# Patient Record
Sex: FEMALE | Race: WHITE | NOT HISPANIC OR LATINO | Employment: OTHER | URBAN - METROPOLITAN AREA
[De-identification: names, ages, dates, MRNs, and addresses within clinical notes are randomized per-mention and may not be internally consistent; named-entity substitution may affect disease eponyms.]

---

## 2024-01-27 ENCOUNTER — APPOINTMENT (OUTPATIENT)
Dept: RADIOLOGY | Facility: MEDICAL CENTER | Age: 89
End: 2024-01-27
Attending: EMERGENCY MEDICINE
Payer: MEDICARE

## 2024-01-27 ENCOUNTER — HOSPITAL ENCOUNTER (EMERGENCY)
Facility: MEDICAL CENTER | Age: 89
End: 2024-01-27
Attending: EMERGENCY MEDICINE
Payer: MEDICARE

## 2024-01-27 VITALS
OXYGEN SATURATION: 91 % | HEIGHT: 64 IN | RESPIRATION RATE: 18 BRPM | HEART RATE: 98 BPM | WEIGHT: 135 LBS | BODY MASS INDEX: 23.05 KG/M2 | DIASTOLIC BLOOD PRESSURE: 75 MMHG | SYSTOLIC BLOOD PRESSURE: 130 MMHG | TEMPERATURE: 97.8 F

## 2024-01-27 DIAGNOSIS — S00.83XA FACIAL CONTUSION, INITIAL ENCOUNTER: ICD-10-CM

## 2024-01-27 DIAGNOSIS — R09.02 HYPOXIA: ICD-10-CM

## 2024-01-27 DIAGNOSIS — S01.81XA FACIAL LACERATION, INITIAL ENCOUNTER: ICD-10-CM
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
## 2024-01-27 DIAGNOSIS — S09.90XA CLOSED HEAD INJURY, INITIAL ENCOUNTER: ICD-10-CM

## 2024-01-27 PROCEDURE — 99284 EMERGENCY DEPT VISIT MOD MDM: CPT

## 2024-01-27 PROCEDURE — 700101 HCHG RX REV CODE 250: Performed by: EMERGENCY MEDICINE

## 2024-01-27 PROCEDURE — 303747 HCHG EXTRA SUTURE

## 2024-01-27 PROCEDURE — 71045 X-RAY EXAM CHEST 1 VIEW: CPT

## 2024-01-27 PROCEDURE — 70450 CT HEAD/BRAIN W/O DYE: CPT

## 2024-01-27 PROCEDURE — 73562 X-RAY EXAM OF KNEE 3: CPT | Mod: RT

## 2024-01-27 PROCEDURE — 304999 HCHG REPAIR-SIMPLE/INTERMED LEVEL 1

## 2024-01-27 PROCEDURE — 70486 CT MAXILLOFACIAL W/O DYE: CPT

## 2024-01-27 RX ORDER — LIDOCAINE HYDROCHLORIDE 20 MG/ML
20 INJECTION, SOLUTION INFILTRATION; PERINEURAL ONCE
Status: COMPLETED | OUTPATIENT
Start: 2024-01-27 | End: 2024-01-27

## 2024-01-27 RX ADMIN — LIDOCAINE HYDROCHLORIDE 20 ML: 20 INJECTION, SOLUTION INFILTRATION; PERINEURAL at 13:00

## 2024-01-27 ASSESSMENT — PAIN DESCRIPTION - PAIN TYPE: TYPE: ACUTE PAIN

## 2024-01-27 NOTE — ED NOTES
"Pt discharged to home w/ steady gait. Pt A&Ox4 on RA. Pt in possession of belongings. Pt provided discharge education and information pertaining to medications and follow up appointments. Pt received copy of discharge instructions and verbalized understanding. /75   Pulse 98   Temp 36.6 °C (97.8 °F) (Temporal)   Resp 18   Ht 1.626 m (5' 4\")   Wt 61.2 kg (135 lb)   SpO2 91%   BMI 23.17 kg/m²   "

## 2024-01-27 NOTE — ED TRIAGE NOTES
Chief Complaint   Patient presents with    T-5000 GLF     Pt suffered a GLF from bed    T-5000 Head Injury     Secondary to fall.  Face vs nightstand       Pt BIB family for above complaints. Per pt, pt was attempting to get into bed when she slipped and struck her face on her night stand. - LOC. + daily ASA  Pt is alert and oriented, speaking in full sentences, follows commands and responds appropriately to questions. NAD. Resp are even and unlabored.

## 2024-01-27 NOTE — ED PROVIDER NOTES
ED Provider Note    CHIEF COMPLAINT  Chief Complaint   Patient presents with    T-5000 GLF     Pt suffered a GLF from bed    T-5000 Head Injury     Secondary to fall.  Face vs nightstand       EXTERNAL RECORDS REVIEWED  I reviewed the patient's chart for previous records and none were available    HPI/ROS  LIMITATION TO HISTORY   Select: : None  OUTSIDE HISTORIAN(S):  Family the patient's daughter states that they heard gurgling in her lungs at the urgent care where she was first evaluated    Joaquina Rodriguez is a 89 y.o. female who presents in the care of her daughter after having a fall last night while walking back from the bathroom.  The patient is supposed to use a cane when she walks but she was not using it at the time of her fall.  The patient states she was walking back to the bathroom and could not get to the bed and instead tripped and fell hitting her face on the nightstand and then falling to the floor.  She is complaining of pain in her face.  She denies any headache or vision changes extremity weakness or numbness.  She does have some right-sided knee pain.  The patient states she has a meniscal tear but they will not fix it surgically because of her age but her pain is increased since the fall.  Patient denies any pain in her chest or abdomen no shortness of breath no unilateral weakness or numbness no nausea or vomiting.  She takes an aspirin daily but no other blood thinners.  Her tetanus is up-to-date.    PAST MEDICAL HISTORY       SURGICAL HISTORY  patient denies any surgical history    FAMILY HISTORY  No family history on file.    SOCIAL HISTORY  Social History     Tobacco Use    Smoking status: Not on file    Smokeless tobacco: Not on file   Substance and Sexual Activity    Alcohol use: Not on file    Drug use: Not on file    Sexual activity: Not on file       CURRENT MEDICATIONS  Home Medications       Reviewed by Christy Schofield R.N. (Registered Nurse) on 01/27/24 at 1134  Med List Status: Not  "Addressed     Medication Last Dose Status        Patient Riki Taking any Medications                           ALLERGIES  No Known Allergies    PHYSICAL EXAM  VITAL SIGNS: /78   Pulse (!) 102   Temp 36.4 °C (97.6 °F) (Temporal)   Resp 14   Ht 1.626 m (5' 4\")   Wt 61.2 kg (135 lb)   SpO2 92%   BMI 23.17 kg/m²  87% on room air positive hypoxia    General: Alert awake GCS 15 no acute distress  HEENT: Normocephalic positive for contusion to her left lower cheek without bony step-offs or tenderness no loose teeth or malocclusion no reddy signs or raccoon eyes no septal hematoma on her left lower lip she has a stellate shaped laceration with some mild bleeding  Neck:  No C-spine tenderness step-offs or crepitance trachea midline  Pulmonary: Lungs clear to auscultation bilaterally no wheezes rales or rhonchi no bony crepitance or subcutaneous emphysema no paradoxical chest wall movements no contusions  Cardiac: Regular rate and rhythm no murmurs rubs or gallops equal strong pulses in all extremities   Abdomen: Soft scaphoid nontender nondistended no rebound masses or peritoneal signs no seatbelt sign  Skin: Left facial contusion no lacerations contusions or abrasions no cyanosis  Extremities/Musculoskeletal: Hips and pelvis stable and nontender to palpation no obvious extremity deformity contusions or abrasions equal strength and sensation upper and lower extremities bilaterally  Back:  No T-spine or LS-spine tenderness step-offs or crepitance  Neuro: Alert and oriented x 3 no focal deficits         DIAGNOSTIC STUDIES / PROCEDURES  EKG  I have independently interpreted this EKG  none    LABS  none    RADIOLOGY  I have independently interpreted the diagnostic imaging associated with this visit and am waiting the final reading from the radiologist.   My preliminary interpretation is as follows: ct head no intracranial hemorrhage or mass effect  Radiologist interpretation:   DX-CHEST-PORTABLE (1 VIEW)   Final " Result         1. No acute cardiopulmonary abnormalities are identified.      DX-KNEE 3 VIEWS RIGHT   Final Result         1. No acute osseous abnormality.      CT-MAXILLOFACIAL W/O PLUS RECONS   Final Result         1. No acute maxillofacial fracture.   2. Soft tissue swelling and hematoma about the left infraorbital region.      CT-HEAD W/O   Final Result         1. No acute intracranial abnormality. No evidence of acute intracranial hemorrhage or mass lesion.      2. Soft tissue swelling and hematoma along the left face.                    COURSE & MEDICAL DECISION MAKING    ED Observation Status? No; Patient does not meet criteria for ED Observation.     INITIAL ASSESSMENT, COURSE AND PLAN  Care Narrative: Joaquina Rodriguez is a 89 y.o. female who presents in the care of her daughter after having a fall last night while walking back from the bathroom.  The patient is supposed to use a cane when she walks but she was not using it at the time of her fall.  The patient states she was walking back to the bathroom and could not get to the bed and instead tripped and fell hitting her face on the nightstand and then falling to the floor.  She is complaining of pain in her face.  She denies any headache or vision changes extremity weakness or numbness.  She does have some right-sided knee pain.  The patient states she has a meniscal tear but they will not fix it surgically because of her age but her pain is increased since the fall.  Patient denies any pain in her chest or abdomen no shortness of breath no unilateral weakness or numbness no nausea or vomiting.  She denies any headache dizziness chest pain or weakness prior to her fall.  She takes an aspirin daily but no other blood thinners.  Her tetanus is up-to-date.  On Physical exam she has a laceration to her left lower lip on the mucosal surface and bruising on her below her left eye without obvious bony step-offs or crepitance loose teeth or malocclusion.  She has  no other head injuries or lacerations.  She has no C-spine T-spine or LS-spine tenderness lungs are clear to auscultation abdomen is soft and nontender she is moving all extremities equally without deformity but does complain of some pain in her right knee.        ADDITIONAL PROBLEM LIST  Left knee meniscal tear  DISPOSITION AND DISCUSSIONS  Laceration Repair Procedure Note    Indication: Laceration to left lower lip    Procedure: The patient was placed in the appropriate position and anesthesia around the laceration was obtained by infiltration using 1% Lidocaine without epinephrine. The area was then cleansed with Shur-Clens and draped in a sterile fashion. The laceration was closed with 5-0 Vicryl using interrupted sutures. There were no additional lacerations requiring repair. The wound area was then dressed with bacitracin and a sterile dressing.      Total repaired wound length: 2 cm.     Other Items: Suture count: 6    The patient tolerated the procedure well.    Complications: None      Patient CT head does not show any skull fracture or intracranial hemorrhage.  Her CT maxillofacial does not show any fractures to the face or instability.  X-ray of the knee shows bone-on-bone arthritis but no fractures or dislocations.  I repaired the patient's lip.  She is stable but continues to remain hypoxic in the emergency department.    Patient has been hypoxic in the emergency department requiring 2 L of oxygen nasal cannula.  She states that she did smoke for about 30 years when she was younger.  She has no formal diagnosis of COPD and is not on oxygen in the [place] area where she lives currently.  Patient's chest x-ray does not show any infiltrate or pleural effusion.  We ambulated her to see if she qualified for oxygen but she was not hypoxic enough to qualify for home oxygen.  I have spoken with case management and replaced referral so she can establish with a primary care physician prior to moving to [place] in 2  months.  I explained to her that the stitches were dissolvable and she would not need to see a doctor to get those removed.  She should apply ice to sore areas and take Tylenol as needed for pain.  She will be discharged home in the care of her family in stable condition.    I have discussed management of the patient with the following physicians and MAREN's:  none    Discussion of management with other [facility] or appropriate source(s): Case Management for evaluation for outpatient oxygen and to establish primary care before moving here in March      Escalation of care considered, and ultimately not performed:acute inpatient care management, however at this time, the patient is most appropriate for outpatient management    Barriers to care at this time, including but not limited to: Patient does not have established PCP.     Decision tools and prescription drugs considered including, but not limited to: home oxygen.  The patient will return for new or worsening symptoms and is stable at the time of discharge.    The patient is referred to a primary physician for blood pressure management, diabetic screening, and for all other preventative health concerns.      DISPOSITION:  Patient will be discharged home in stable condition.    FOLLOW UP:  [facility], Emergency Dept  [address] [address] [phone]    As needed, If symptoms worsen    Patient referred to establish with a primary care physician in [place] prior to moving here    OUTPATIENT MEDICATIONS:  There are no discharge medications for this patient.  Over-the-counter Tylenol as needed    FINAL DIAGNOSIS  1. Closed head injury, initial encounter    2. Facial laceration, initial encounter    3. Facial contusion, initial encounter    4. Fall, initial encounter    5. Hypoxia           Electronically signed by: Kerri Staton M.D., 1/27/2024 11:29 AM

## 2024-02-07 ENCOUNTER — TELEPHONE (OUTPATIENT)
Dept: HEALTH INFORMATION MANAGEMENT | Facility: OTHER | Age: 89
End: 2024-02-07